# Patient Record
Sex: MALE | Race: OTHER
[De-identification: names, ages, dates, MRNs, and addresses within clinical notes are randomized per-mention and may not be internally consistent; named-entity substitution may affect disease eponyms.]

---

## 2022-06-21 ENCOUNTER — HOSPITAL ENCOUNTER (INPATIENT)
Dept: HOSPITAL 15 - ER | Age: 52
LOS: 3 days | Discharge: HOME | DRG: 201 | End: 2022-06-24
Attending: FAMILY MEDICINE | Admitting: NURSE PRACTITIONER
Payer: MEDICAID

## 2022-06-21 VITALS — BODY MASS INDEX: 28.32 KG/M2 | HEIGHT: 63 IN | WEIGHT: 159.84 LBS

## 2022-06-21 DIAGNOSIS — J18.9: ICD-10-CM

## 2022-06-21 DIAGNOSIS — E43: ICD-10-CM

## 2022-06-21 DIAGNOSIS — Z20.822: ICD-10-CM

## 2022-06-21 DIAGNOSIS — Z83.3: ICD-10-CM

## 2022-06-21 DIAGNOSIS — F32.A: ICD-10-CM

## 2022-06-21 DIAGNOSIS — G89.29: ICD-10-CM

## 2022-06-21 DIAGNOSIS — D64.9: ICD-10-CM

## 2022-06-21 DIAGNOSIS — K29.80: ICD-10-CM

## 2022-06-21 DIAGNOSIS — E86.0: ICD-10-CM

## 2022-06-21 DIAGNOSIS — D50.9: ICD-10-CM

## 2022-06-21 DIAGNOSIS — K26.9: ICD-10-CM

## 2022-06-21 DIAGNOSIS — M50.30: ICD-10-CM

## 2022-06-21 DIAGNOSIS — G93.41: ICD-10-CM

## 2022-06-21 DIAGNOSIS — M51.36: ICD-10-CM

## 2022-06-21 DIAGNOSIS — Z87.442: ICD-10-CM

## 2022-06-21 DIAGNOSIS — E87.6: ICD-10-CM

## 2022-06-21 DIAGNOSIS — F17.200: ICD-10-CM

## 2022-06-21 DIAGNOSIS — R00.1: Primary | ICD-10-CM

## 2022-06-21 DIAGNOSIS — N40.0: ICD-10-CM

## 2022-06-21 DIAGNOSIS — I95.9: ICD-10-CM

## 2022-06-21 DIAGNOSIS — F41.9: ICD-10-CM

## 2022-06-21 DIAGNOSIS — M19.90: ICD-10-CM

## 2022-06-21 DIAGNOSIS — K29.70: ICD-10-CM

## 2022-06-21 LAB
ALBUMIN SERPL-MCNC: 2.9 G/DL (ref 3.4–5)
ALP SERPL-CCNC: 79 U/L (ref 45–117)
ALT SERPL-CCNC: 13 U/L (ref 16–61)
ANION GAP SERPL CALCULATED.3IONS-SCNC: 8 MMOL/L (ref 5–15)
APTT PPP: 24.7 SEC (ref 23.6–33)
BILIRUB SERPL-MCNC: 0.3 MG/DL (ref 0.2–1)
BUN SERPL-MCNC: 28 MG/DL (ref 7–18)
BUN/CREAT SERPL: 22.4
CALCIUM SERPL-MCNC: 7.6 MG/DL (ref 8.5–10.1)
CHLORIDE SERPL-SCNC: 110 MMOL/L (ref 98–107)
CO2 SERPL-SCNC: 21 MMOL/L (ref 21–32)
GLUCOSE SERPL-MCNC: 170 MG/DL (ref 74–106)
HCT VFR BLD AUTO: 29.8 % (ref 41–53)
HCT VFR BLD AUTO: 30.5 % (ref 41–53)
HGB BLD-MCNC: 9.3 G/DL (ref 13.5–17.5)
HGB BLD-MCNC: 9.7 G/DL (ref 13.5–17.5)
INR PPP: 1.13 (ref 0.9–1.15)
MCH RBC QN AUTO: 24.6 PG (ref 28–32)
MCV RBC AUTO: 77.6 FL (ref 80–100)
NRBC BLD QL AUTO: 0.1 %
POTASSIUM SERPL-SCNC: 3.2 MMOL/L (ref 3.5–5.1)
PROT SERPL-MCNC: 5.7 G/DL (ref 6.4–8.2)
SODIUM SERPL-SCNC: 139 MMOL/L (ref 136–145)

## 2022-06-21 PROCEDURE — 70551 MRI BRAIN STEM W/O DYE: CPT

## 2022-06-21 PROCEDURE — 70450 CT HEAD/BRAIN W/O DYE: CPT

## 2022-06-21 PROCEDURE — 96375 TX/PRO/DX INJ NEW DRUG ADDON: CPT

## 2022-06-21 PROCEDURE — 36415 COLL VENOUS BLD VENIPUNCTURE: CPT

## 2022-06-21 PROCEDURE — 96367 TX/PROPH/DG ADDL SEQ IV INF: CPT

## 2022-06-21 PROCEDURE — 95819 EEG AWAKE AND ASLEEP: CPT

## 2022-06-21 PROCEDURE — 85379 FIBRIN DEGRADATION QUANT: CPT

## 2022-06-21 PROCEDURE — 71045 X-RAY EXAM CHEST 1 VIEW: CPT

## 2022-06-21 PROCEDURE — 81001 URINALYSIS AUTO W/SCOPE: CPT

## 2022-06-21 PROCEDURE — 80307 DRUG TEST PRSMV CHEM ANLYZR: CPT

## 2022-06-21 PROCEDURE — 74177 CT ABD & PELVIS W/CONTRAST: CPT

## 2022-06-21 PROCEDURE — 93005 ELECTROCARDIOGRAM TRACING: CPT

## 2022-06-21 PROCEDURE — 71275 CT ANGIOGRAPHY CHEST: CPT

## 2022-06-21 PROCEDURE — 83690 ASSAY OF LIPASE: CPT

## 2022-06-21 PROCEDURE — 96361 HYDRATE IV INFUSION ADD-ON: CPT

## 2022-06-21 PROCEDURE — 85730 THROMBOPLASTIN TIME PARTIAL: CPT

## 2022-06-21 PROCEDURE — 83605 ASSAY OF LACTIC ACID: CPT

## 2022-06-21 PROCEDURE — 86901 BLOOD TYPING SEROLOGIC RH(D): CPT

## 2022-06-21 PROCEDURE — 86900 BLOOD TYPING SEROLOGIC ABO: CPT

## 2022-06-21 PROCEDURE — 80053 COMPREHEN METABOLIC PANEL: CPT

## 2022-06-21 PROCEDURE — 86850 RBC ANTIBODY SCREEN: CPT

## 2022-06-21 PROCEDURE — 85018 HEMOGLOBIN: CPT

## 2022-06-21 PROCEDURE — 84443 ASSAY THYROID STIM HORMONE: CPT

## 2022-06-21 PROCEDURE — 93970 EXTREMITY STUDY: CPT

## 2022-06-21 PROCEDURE — 72148 MRI LUMBAR SPINE W/O DYE: CPT

## 2022-06-21 PROCEDURE — 96365 THER/PROPH/DIAG IV INF INIT: CPT

## 2022-06-21 PROCEDURE — 83880 ASSAY OF NATRIURETIC PEPTIDE: CPT

## 2022-06-21 PROCEDURE — 83735 ASSAY OF MAGNESIUM: CPT

## 2022-06-21 PROCEDURE — 85610 PROTHROMBIN TIME: CPT

## 2022-06-21 PROCEDURE — 96368 THER/DIAG CONCURRENT INF: CPT

## 2022-06-21 PROCEDURE — 71260 CT THORAX DX C+: CPT

## 2022-06-21 PROCEDURE — 85025 COMPLETE CBC W/AUTO DIFF WBC: CPT

## 2022-06-21 PROCEDURE — 43239 EGD BIOPSY SINGLE/MULTIPLE: CPT

## 2022-06-21 PROCEDURE — 85014 HEMATOCRIT: CPT

## 2022-06-21 PROCEDURE — 93306 TTE W/DOPPLER COMPLETE: CPT

## 2022-06-21 PROCEDURE — 99291 CRITICAL CARE FIRST HOUR: CPT

## 2022-06-21 PROCEDURE — 84484 ASSAY OF TROPONIN QUANT: CPT

## 2022-06-22 VITALS — DIASTOLIC BLOOD PRESSURE: 85 MMHG | SYSTOLIC BLOOD PRESSURE: 132 MMHG

## 2022-06-22 VITALS — SYSTOLIC BLOOD PRESSURE: 118 MMHG | DIASTOLIC BLOOD PRESSURE: 71 MMHG

## 2022-06-22 LAB
ALCOHOL, URINE: < 3 MG/DL (ref 0–10)
AMPHETAMINES UR QL SCN: NEGATIVE
BARBITURATES UR QL SCN: NEGATIVE
BENZODIAZ UR QL SCN: NEGATIVE
BZE UR QL SCN: NEGATIVE
CANNABINOIDS UR QL SCN: POSITIVE
OPIATES UR QL SCN: POSITIVE
PCP UR QL SCN: NEGATIVE

## 2022-06-22 RX ADMIN — AZITHROMYCIN DIHYDRATE SCH MLS/HR: 500 INJECTION, POWDER, LYOPHILIZED, FOR SOLUTION INTRAVENOUS at 09:37

## 2022-06-22 RX ADMIN — PANTOPRAZOLE SODIUM SCH MG: 40 TABLET, DELAYED RELEASE ORAL at 09:26

## 2022-06-22 RX ADMIN — OXYCODONE HYDROCHLORIDE AND ACETAMINOPHEN PRN TAB: 5; 325 TABLET ORAL at 18:06

## 2022-06-22 RX ADMIN — OXYCODONE HYDROCHLORIDE AND ACETAMINOPHEN PRN TAB: 5; 325 TABLET ORAL at 13:53

## 2022-06-23 VITALS — DIASTOLIC BLOOD PRESSURE: 78 MMHG | SYSTOLIC BLOOD PRESSURE: 132 MMHG

## 2022-06-23 VITALS — SYSTOLIC BLOOD PRESSURE: 111 MMHG | DIASTOLIC BLOOD PRESSURE: 73 MMHG

## 2022-06-23 VITALS — SYSTOLIC BLOOD PRESSURE: 135 MMHG | DIASTOLIC BLOOD PRESSURE: 88 MMHG

## 2022-06-23 VITALS — DIASTOLIC BLOOD PRESSURE: 88 MMHG | SYSTOLIC BLOOD PRESSURE: 134 MMHG

## 2022-06-23 VITALS — SYSTOLIC BLOOD PRESSURE: 132 MMHG | DIASTOLIC BLOOD PRESSURE: 87 MMHG

## 2022-06-23 LAB
ALBUMIN SERPL-MCNC: 3 G/DL (ref 3.4–5)
ALP SERPL-CCNC: 80 U/L (ref 45–117)
ALT SERPL-CCNC: 12 U/L (ref 16–61)
ANION GAP SERPL CALCULATED.3IONS-SCNC: 7 MMOL/L (ref 5–15)
BILIRUB SERPL-MCNC: 0.3 MG/DL (ref 0.2–1)
BUN SERPL-MCNC: 14 MG/DL (ref 7–18)
BUN/CREAT SERPL: 15.1
CALCIUM SERPL-MCNC: 8.3 MG/DL (ref 8.5–10.1)
CHLORIDE SERPL-SCNC: 112 MMOL/L (ref 98–107)
CO2 SERPL-SCNC: 23 MMOL/L (ref 21–32)
GLUCOSE SERPL-MCNC: 96 MG/DL (ref 74–106)
HCT VFR BLD AUTO: 30.4 % (ref 41–53)
HGB BLD-MCNC: 9.8 G/DL (ref 13.5–17.5)
MCH RBC QN AUTO: 24.6 PG (ref 28–32)
MCV RBC AUTO: 76.5 FL (ref 80–100)
NRBC BLD QL AUTO: 0.2 %
POTASSIUM SERPL-SCNC: 3.3 MMOL/L (ref 3.5–5.1)
PROT SERPL-MCNC: 6.1 G/DL (ref 6.4–8.2)
SODIUM SERPL-SCNC: 142 MMOL/L (ref 136–145)

## 2022-06-23 RX ADMIN — OXYCODONE HYDROCHLORIDE AND ACETAMINOPHEN PRN TAB: 5; 325 TABLET ORAL at 16:19

## 2022-06-23 RX ADMIN — OXYCODONE HYDROCHLORIDE AND ACETAMINOPHEN PRN TAB: 5; 325 TABLET ORAL at 10:45

## 2022-06-23 RX ADMIN — PANTOPRAZOLE SODIUM SCH MG: 40 TABLET, DELAYED RELEASE ORAL at 10:44

## 2022-06-23 RX ADMIN — AZITHROMYCIN DIHYDRATE SCH MLS/HR: 500 INJECTION, POWDER, LYOPHILIZED, FOR SOLUTION INTRAVENOUS at 10:50

## 2022-06-23 RX ADMIN — OXYCODONE HYDROCHLORIDE AND ACETAMINOPHEN PRN TAB: 5; 325 TABLET ORAL at 05:10

## 2022-06-24 VITALS — DIASTOLIC BLOOD PRESSURE: 92 MMHG | SYSTOLIC BLOOD PRESSURE: 134 MMHG

## 2022-06-24 VITALS — DIASTOLIC BLOOD PRESSURE: 86 MMHG | SYSTOLIC BLOOD PRESSURE: 135 MMHG

## 2022-06-24 VITALS — DIASTOLIC BLOOD PRESSURE: 87 MMHG | SYSTOLIC BLOOD PRESSURE: 124 MMHG

## 2022-06-24 PROCEDURE — 0DB78ZX EXCISION OF STOMACH, PYLORUS, VIA NATURAL OR ARTIFICIAL OPENING ENDOSCOPIC, DIAGNOSTIC: ICD-10-PCS | Performed by: INTERNAL MEDICINE

## 2022-06-24 RX ADMIN — OXYCODONE HYDROCHLORIDE AND ACETAMINOPHEN PRN TAB: 5; 325 TABLET ORAL at 03:19

## 2022-06-24 RX ADMIN — SUCRALFATE SCH GM: 1 SUSPENSION ORAL at 16:13

## 2022-06-24 RX ADMIN — SUCRALFATE SCH GM: 1 SUSPENSION ORAL at 10:28

## 2022-06-24 RX ADMIN — OXYCODONE HYDROCHLORIDE AND ACETAMINOPHEN PRN TAB: 5; 325 TABLET ORAL at 13:52

## 2022-06-24 RX ADMIN — AZITHROMYCIN DIHYDRATE SCH MLS/HR: 500 INJECTION, POWDER, LYOPHILIZED, FOR SOLUTION INTRAVENOUS at 10:29

## 2022-06-26 ENCOUNTER — HOSPITAL ENCOUNTER (EMERGENCY)
Dept: HOSPITAL 15 - ER | Age: 52
LOS: 1 days | Discharge: LEFT BEFORE DISCHARGE | End: 2022-06-27
Payer: MEDICAID

## 2022-06-26 VITALS — HEIGHT: 67 IN | BODY MASS INDEX: 25.11 KG/M2 | WEIGHT: 160 LBS

## 2022-06-26 DIAGNOSIS — R55: Primary | ICD-10-CM

## 2022-06-26 DIAGNOSIS — F17.210: ICD-10-CM

## 2022-06-26 DIAGNOSIS — F32.9: ICD-10-CM

## 2022-06-26 DIAGNOSIS — Z53.21: ICD-10-CM

## 2022-06-26 DIAGNOSIS — F41.9: ICD-10-CM

## 2022-06-26 DIAGNOSIS — Z87.442: ICD-10-CM

## 2022-06-26 LAB
ALBUMIN SERPL-MCNC: 3.2 G/DL (ref 3.4–5)
ALP SERPL-CCNC: 71 U/L (ref 45–117)
ALT SERPL-CCNC: 13 U/L (ref 16–61)
ANION GAP SERPL CALCULATED.3IONS-SCNC: 10 MMOL/L (ref 5–15)
APTT PPP: 21 SEC (ref 23.6–33)
BILIRUB SERPL-MCNC: 0.2 MG/DL (ref 0.2–1)
BUN SERPL-MCNC: 35 MG/DL (ref 7–18)
BUN/CREAT SERPL: 36.1
CALCIUM SERPL-MCNC: 8.6 MG/DL (ref 8.5–10.1)
CHLORIDE SERPL-SCNC: 112 MMOL/L (ref 98–107)
CO2 SERPL-SCNC: 22 MMOL/L (ref 21–32)
GLUCOSE SERPL-MCNC: 130 MG/DL (ref 74–106)
HCT VFR BLD AUTO: 25.4 % (ref 41–53)
HGB BLD-MCNC: 7.9 G/DL (ref 13.5–17.5)
INR PPP: 1.06 (ref 0.9–1.15)
MAGNESIUM SERPL-MCNC: 1.8 MG/DL (ref 1.6–2.6)
MCH RBC QN AUTO: 24.1 PG (ref 28–32)
MCV RBC AUTO: 77.6 FL (ref 80–100)
NRBC BLD QL AUTO: 0.2 %
POTASSIUM SERPL-SCNC: 3.6 MMOL/L (ref 3.5–5.1)
PROT SERPL-MCNC: 6.2 G/DL (ref 6.4–8.2)
SODIUM SERPL-SCNC: 144 MMOL/L (ref 136–145)

## 2022-06-26 PROCEDURE — 85730 THROMBOPLASTIN TIME PARTIAL: CPT

## 2022-06-26 PROCEDURE — 85025 COMPLETE CBC W/AUTO DIFF WBC: CPT

## 2022-06-26 PROCEDURE — 85610 PROTHROMBIN TIME: CPT

## 2022-06-26 PROCEDURE — 84484 ASSAY OF TROPONIN QUANT: CPT

## 2022-06-26 PROCEDURE — 80053 COMPREHEN METABOLIC PANEL: CPT

## 2022-06-26 PROCEDURE — 72125 CT NECK SPINE W/O DYE: CPT

## 2022-06-26 PROCEDURE — 71045 X-RAY EXAM CHEST 1 VIEW: CPT

## 2022-06-26 PROCEDURE — 70450 CT HEAD/BRAIN W/O DYE: CPT

## 2022-06-26 PROCEDURE — 36415 COLL VENOUS BLD VENIPUNCTURE: CPT

## 2022-06-26 PROCEDURE — 83735 ASSAY OF MAGNESIUM: CPT

## 2022-06-27 VITALS — SYSTOLIC BLOOD PRESSURE: 133 MMHG | DIASTOLIC BLOOD PRESSURE: 86 MMHG

## 2025-01-19 ENCOUNTER — HOSPITAL ENCOUNTER (INPATIENT)
Dept: HOSPITAL 15 - ER | Age: 55
LOS: 1 days | Discharge: LEFT BEFORE DISCHARGE | DRG: 52 | End: 2025-01-20
Attending: NURSE PRACTITIONER
Payer: MEDICAID

## 2025-01-19 VITALS — HEIGHT: 68 IN | WEIGHT: 160.28 LBS | BODY MASS INDEX: 24.29 KG/M2

## 2025-01-19 VITALS — HEART RATE: 65 BPM | OXYGEN SATURATION: 97 % | RESPIRATION RATE: 16 BRPM

## 2025-01-19 VITALS — RESPIRATION RATE: 18 BRPM | OXYGEN SATURATION: 97 % | HEART RATE: 83 BPM

## 2025-01-19 DIAGNOSIS — I10: ICD-10-CM
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
## 2025-01-19 DIAGNOSIS — F32.A: ICD-10-CM

## 2025-01-19 DIAGNOSIS — Z53.29: ICD-10-CM

## 2025-01-19 DIAGNOSIS — Z86.73: ICD-10-CM

## 2025-01-19 DIAGNOSIS — F41.9: ICD-10-CM

## 2025-01-19 DIAGNOSIS — T40.605A: ICD-10-CM

## 2025-01-19 DIAGNOSIS — F17.210: ICD-10-CM

## 2025-01-19 DIAGNOSIS — G92.8: Primary | ICD-10-CM

## 2025-01-19 DIAGNOSIS — Z79.899: ICD-10-CM

## 2025-01-19 DIAGNOSIS — T43.595A: ICD-10-CM

## 2025-01-19 DIAGNOSIS — Z87.442: ICD-10-CM

## 2025-01-19 LAB
ALBUMIN SERPL-MCNC: 4.5 G/DL (ref 3.2–4.8)
ALP SERPL-CCNC: 111 U/L (ref 46–116)
ALT SERPL-CCNC: 10 U/L (ref 7–40)
ANION GAP SERPL CALCULATED.3IONS-SCNC: 7 MMOL/L (ref 5–15)
APTT PPP: 32.1 SEC (ref 24.5–34.5)
BILIRUB SERPL-MCNC: 0.6 MG/DL (ref 0.2–1)
BUN SERPL-MCNC: 6 MG/DL (ref 9–23)
BUN/CREAT SERPL: 6.5 (ref 10–20)
CALCIUM SERPL-MCNC: 10.1 MG/DL (ref 8.7–10.4)
CHLORIDE SERPL-SCNC: 111 MMOL/L (ref 98–107)
CO2 SERPL-SCNC: 24 MMOL/L (ref 20–31)
GLUCOSE SERPL-MCNC: 111 MG/DL (ref 74–106)
HCT VFR BLD AUTO: 39 % (ref 41–53)
HGB BLD-MCNC: 12.3 G/DL (ref 13.5–17.5)
INR PPP: 1.03 (ref 0.9–1.15)
MAGNESIUM SERPL-MCNC: 2 MG/DL (ref 1.6–2.6)
MCH RBC QN AUTO: 24.4 PG (ref 28–32)
MCV RBC AUTO: 77.6 FL (ref 80–100)
NRBC BLD QL AUTO: 0.3 %
POTASSIUM SERPL-SCNC: 3.6 MMOL/L (ref 3.5–5.1)
PROT SERPL-MCNC: 6.9 G/DL (ref 5.7–8.2)
PROTHROMBIN TIME: 10.9 SEC (ref 9.3–11.8)
SODIUM SERPL-SCNC: 142 MMOL/L (ref 136–145)

## 2025-01-19 PROCEDURE — 82140 ASSAY OF AMMONIA: CPT

## 2025-01-19 PROCEDURE — 70450 CT HEAD/BRAIN W/O DYE: CPT

## 2025-01-19 PROCEDURE — 83880 ASSAY OF NATRIURETIC PEPTIDE: CPT

## 2025-01-19 PROCEDURE — 82962 GLUCOSE BLOOD TEST: CPT

## 2025-01-19 PROCEDURE — 85025 COMPLETE CBC W/AUTO DIFF WBC: CPT

## 2025-01-19 PROCEDURE — 81001 URINALYSIS AUTO W/SCOPE: CPT

## 2025-01-19 PROCEDURE — 85610 PROTHROMBIN TIME: CPT

## 2025-01-19 PROCEDURE — 80307 DRUG TEST PRSMV CHEM ANLYZR: CPT

## 2025-01-19 PROCEDURE — 80053 COMPREHEN METABOLIC PANEL: CPT

## 2025-01-19 PROCEDURE — 71045 X-RAY EXAM CHEST 1 VIEW: CPT

## 2025-01-19 PROCEDURE — 99291 CRITICAL CARE FIRST HOUR: CPT

## 2025-01-19 PROCEDURE — 83735 ASSAY OF MAGNESIUM: CPT

## 2025-01-19 PROCEDURE — 85730 THROMBOPLASTIN TIME PARTIAL: CPT

## 2025-01-19 PROCEDURE — 84484 ASSAY OF TROPONIN QUANT: CPT

## 2025-01-19 PROCEDURE — 36415 COLL VENOUS BLD VENIPUNCTURE: CPT

## 2025-01-19 PROCEDURE — 93005 ELECTROCARDIOGRAM TRACING: CPT

## 2025-01-19 RX ADMIN — HYDROCODONE BITARTRATE AND ACETAMINOPHEN PRN TAB: 5; 325 TABLET ORAL at 21:48

## 2025-01-19 RX ADMIN — SODIUM CHLORIDE SCH MLS/HR: 0.9 INJECTION, SOLUTION INTRAVENOUS at 15:45

## 2025-01-19 NOTE — BSKYNEURO
[facility]





# Demographics


Consult Type: Acute Stroke Level 2 (4.5-24 hrs)





Patient Location: Emergency Room





First Name: DUANE





Last Name: AMANDEEP





YOB: 1970





Age: 54





Gender: Male





Facility: [facility]





Time of Initial Page ([place] Time): 01/19/2025, 08:53





Time of Return Call ([place] Time): 01/19/2025, 08:53





# HPI


History:


[id number]


Patient is coming to ER for speech changes; he was not able to communicate with 

family and was brought for difficulty with word finding.





# Scores


Time of exam and NIHSS ([place] Time): 01/19/2025, 12:07





Level of Consciousness 1a: [1] = Not alert; but arousable by minor stim





LOC Questions 1b: [2] = Answers neither correctly





LOC Commands 1c: [0] = Performs both tasks correctly





Best Gaze 2: [0] = Normal





Visual 3: [0] = No visual loss





Facial Palsy 4: [0] = Normal symmetrical movements





Motor Arm Left 5a: [0] = No drift





Motor Arm Right 5b: [0] = No drift





Motor Leg Left 6a: [0] = No drift





Motor Leg Right 6b: [0] = No drift





Limb Ataxia 7: [0] = Absent





Sensory 8: [0] = Normal





Best Language 9: [2] = Severe aphasia





Dysarthria 10: [2] = Severe dysarthria





Extinction and Inattention 11: [0] = No abnormality





NIHSS Total: 7





# Assessment


Impression:


- Altered Mental Status


- Stroke Mimic


Patient looks encephalopathic; likely metabolic/infectious. If no other obvious 

cause of encephalopathy and patient is not improving, will need MRI brain w/o 

contrast and EEG





# Plan


Thrombolytic/Intervention: NOT IV Thrombolysis or IA Intervention candidate





Intraarterial Exclusion:


- no large vessel occlusion (LVO)





Thrombolytic/Intraarterial Exclusion:


- IV thrombolytic and IA intervention considered but not recommended as this 

patient's symptoms are not clinically consistent with an assumed diagnosis of 

stroke





Labs:


- Ammonia


- urine drug screen


- ua


- CBC


- comprehensive metabolic panel


- ESR


- ABG





Imaging: (urgency: routine):


- MRI Brain without contrast





Diagnostic Test:


- EEG





Other:


- If patient has any neurological deterioration please call me back immediately


- I have discussed my recommendations with the referring provider


- would not pursue stroke work-up if MRI is negative


- telemetry monitoring





# Logistics


Attestation of consult completion: The patient is located at: [facility]. Facility staff participated in the visit. I performed this 

telemedicine visit from my offsite office utilizing interactive 2 way audio and 

visual telecommunication technology.





Total time spent in telemedicine encounter: I spent 10 minutes reviewing 

clinical data and/or imaging, obtaining history, examining the patient, 

communicating with the onsite care team, and in preparation of this report.





# Demographics


First Name: DUANE





Last Name: AMANDEEP





Facility: [facility]


Yes











ISIS SHAHID MD               Jan 19, 2025 13:48

## 2025-01-19 NOTE — DVHHP2
History of Present Illness


Reason for Visit:  altered


History of Present Illness


This 54 year old male presents in the ED with a chief complaint of ALOC.  The 

patient is alert and oriented to name and date of birth but somehow confused, 

very sleepy, and unable to keep eyes open.  According to nursing staff, the 

patient was last seen alert and oriented this morning around 1:00 a.m. the 

patient change of mentation started early this morning, sister states that the 

patient started taking some type of medications.


Past Medical History


Anxiety, arthritis, CVA, depression, kidney stones


Past Surgical History


Denies


Past Social History


Unknown





Review of Systems


Constitutional:  Yes: Malaise; No: Fever, Chills, Sweats, Weakness, Other


Eyes:  No: Pain, Vision change, Conjunctivae inflammation, Eyelid inflammation, 

Other, Redness


ENT:  No: Ear pain, Ear discharge, Nose pain, Nose discharge, Nose congestion, 

Mouth pain, Mouth swelling, Throat pain, Throat swelling, Other


Respiratory:  No: Cough, Dry, Shortness of breath, SOB with excertion, Wheezing,

Hemoptysis, Pleuritic Pain, Sputum, Wheezing, Other


Cardiovascular:  No: Chest Pain, Palpitations, Orthopnea, Paroxysmal Noc. 

Dyspnea, Edema, Lt Headedness, Other


Gastrointestinal:  No: Nausea, Vomiting, Abdominal Pain, Diarrhea, Constipation,

Melena, Hematochezia, Other


Genitourinary:  No Dysuria, No Frequency, No Incontinence, No Hematuria, No 

Retention, No Other


Musculoskeletal:  No: other, neck pain, shoulder pain, arm pain, back pain, hand

pain, leg pain, foot pain


Skin:  No: Rash, Lesions, Jaundice, Bruising, Other


Neurological:  Confusion; No: Weakness, Numbness, Incoordination, Change in 

speech, Seizures, Other


Allergies:  


Coded Allergies:  


     NO KNOWN ALLERGIES (Unverified , 8/13/11)





Exam


Vital Signs





Vital Signs








  Date Time  Temp Pulse Resp B/P (MAP) Pulse Ox O2 Delivery O2 Flow Rate FiO2


 


1/19/25 14:00  45 19 179/91 (120) 99   


 


1/19/25 09:39      Room Air* 0 21


 


1/19/25 08:40 98.7       








General Appearance:  mild distress


HEENT:  Atraumatic, PERRLA, Mucous membr. moist/pink


Respiratory:  Clear to auscultation, Normal air movement


Cardiovascular:  Regular rate, Normal S1, Normal S2


Abdominal:  Normal bowel sounds, Soft, No tenderness


Extremities:  No clubbing, No cyanosis, No edema, Normal pulses


Skin:  No rashes, No breakdown, No significant lesion


Neuro:  Other (Confused)





Labs/Xrays





                                      Labs








Test


 1/19/25


12:57 1/19/25


10:05 1/19/25


09:20 1/19/25


09:05 Range/Units


 


 


Ammonia 15     11-32  umol/L


 


Troponin I High Sensitivity  < 3 L   </=54  ng/L


 


POC Glucose   102     mg/dl


 


White Blood Count


 


 


 


 7.2 


 4.4-10.8


10^3/uL


 


Red Blood Count


 


 


 


 5.02 


 4.5-5.90


10^6/uL


 


Hemoglobin    12.3 L 13.5-17.5  g/dL


 


Hematocrit    39.0 L 41.0-53.0  %


 


Mean Corpuscular Volume    77.6 L 80.0-100.0  fL


 


Mean Corpuscular Hemoglobin    24.4 L 28.0-32.0  pg


 


Mean Corpuscular Hemoglobin


Concent 


 


 


 31.4 L


 32.0-36.0  g/dL





 


Red Cell Distribution Width    17.9 H 11.8-14.3  %


 


Platelet Count


 


 


 


 396 


 140-450


10^3/uL


 


Mean Platelet Volume    6.6 L 6.9-10.8  fL


 


Neutrophils (%) (Auto)    58.7  37.0-80.0  %


 


Lymphocytes (%) (Auto)    30.0  10.0-50.0  %


 


Monocytes (%) (Auto)    8.5  0.0-12.0  %


 


Eosinophils (%) (Auto)    1.5  0.0-7.0  %


 


Basophils (%) (Auto)    1.3  0.0-2.0  %


 


Neutrophils # (Auto)


 


 


 


 4.2 


 1.6-8.6  10


^3/uL


 


Lymphocytes # (Auto)


 


 


 


 2.2 


 0.4-5.4  10


^3/uL


 


Monocytes # (Auto)    0.6  0-1.3  10 ^3/uL


 


Eosinophils # (Auto)    0.1  0-0.8  10 ^3/uL


 


Basophils # (Auto)    0.1  0-0.2  10 ^3/uL


 


Nucleated Red Blood Cells    0.3   %


 


Prothrombin Time    10.9  9.3-11.8  sec


 


Prothrombin Time INR    1.03  0.9-1.15  


 


Activated Partial


Thromboplast Time 


 


 


 32.1 


 24.5-34.5  SEC





 


Sodium Level    142  136-145  mmol/L


 


Potassium Level    3.6  3.5-5.1  mmol/L


 


Chloride Level    111 H   mmol/L


 


Carbon Dioxide Level    24  20-31  mmol/L


 


Anion Gap    7  5-15  


 


Blood Urea Nitrogen    6 L 9-23  mg/dL


 


Creatinine


 


 


 


 0.92 


 0.700-1.30


mg/dL


 


Glomerular Filtration Rate


Calc 


 


 


 99 


 >90  mL/min





 


BUN/Creatinine Ratio    6.5 L 10.0-20.0  


 


Serum Glucose    111 H   mg/dL


 


Calcium Level    10.1  8.7-10.4  mg/dL


 


Magnesium Level    2.0  1.6-2.6  mg/dL


 


Total Bilirubin    0.6  0.2-1.0  mg/dL


 


Aspartate Amino Transferase


(AST) 


 


 


 17 


 13-40  U/L





 


Alanine Aminotransferase (ALT)    10  7-40  U/L


 


Alkaline Phosphatase    111    U/L


 


B-Type Natriuretic Peptide    21.45  0-100  pg/mL


 


Total Protein    6.9  5.7-8.2  g/dL


 


Albumin    4.5  3.2-4.8  g/dL





PROCEDURE(s): CTH - STROKE CTH


REASON: cva expressive aphasia


ORDER NUMBER(s): [id number], ACCESSION NUMBER(s): [id number]








CT STROKE CTH





INDICATION: cva expressive aphasia





EXAM DATE: 01/19/2025 09:04 AM





COMPARISON: HEAD WITHOUT CONTRAST on DOS: 6/26/22





RADIATION DOSE:  CTDIvol: 59 mGy, DLP: 1194 mGy*cm





PROCEDURE: CT scans of the head were obtained from the vertex to the skull base.

Sagittal and coronal reconstructions were provided.





All CT scans at this medical facility are performed using dose modulation 

techniques as appropriate to a performed exam including the following: Automated

exposure control was utilized; adjustment of the MA and/or KV according to 

patient size; and use of iterative reconstruction technique.





FINDINGS:   There is sulcal and ventricular prominence. The brain otherwise 

shows normal morphology and gray-white matter differentiation, without 

intracranial hemorrhage, extra-axial fluid collection, mass effect or acute 

large vessel infarct.The basal cisterns are patent. The skull and visible facial

bones are intact. The paranasal sinuses, mastoid air cells and middle ear 

cavities are well-aerated. The soft tissues of the scalp are unremarkable.





IMPRESSION: 





No acute intracranial abnormality.





Assessment/Plan


Assessment/Plan


#Altered level of consciousness


# possible acute toxic encephalopathy


Admit to telemetry unit


CT of head reviewed


Neurology consult completed


Check for UDS


Scherer catheter





# depression, anxiety


Hold medications





# hx CVA





Medical plan discussed with patient and RN


Plan discussed with:  Patient


My Orders





                          Orders - NISA BARKER








Procedure Category Date Status





  Time 


 


Urinalysis LAB 1/19/25 Logged





  15:18 


 


Drug Screen LAB 1/19/25 Logged





  15:18 


 


Insert Scherer Catheter CARLY 1/19/25 Verified





  15:33 


 


Admit ADMIT 1/19/25 Verified





  15:33 


 


Code Status CODE 1/19/25 Verified





  15:33 


 


0.9% Ns 1000 Ml PHA 1/19/25 Verified





  15:45 


 


Ondansetron Hcl PHA 1/19/25 Verified





 (Zofran)  15:45 


 


Enoxaparin Sodium PHA 1/20/25 Verified





 (Lovenox)  10:00 


 


Fall Risk Precautions CARLY 1/19/25 Verified





In Place  15:33 


 


Complete Blood Count LAB 1/20/25 Verified





  04:00 


 


Comprehensive LAB 1/20/25 Verified





Metabolic Panel  04:00 


 


Cardiac DIET 1/19/25 Verified





Diet-2gna,Lofat,Lochol  Dinner 


 


Condition: Fair CARLY 1/19/25 Verified





  15:33 











Date of Service:  Jan 19, 2025


Billing Provider:  NISA BARKER


Common Visit Codes:  99223-INITIAL  INP/OBS CARE (HIGH)











NISA BARKERP            Jan 19, 2025 15:52

## 2025-01-19 NOTE — DVH
CT STROKE CTH



INDICATION: cva expressive aphasia



EXAM DATE: 01/19/2025 09:04 AM



COMPARISON: HEAD WITHOUT CONTRAST on DOS: 6/26/22



RADIATION DOSE:  CTDIvol: 59 mGy, DLP: 1194 mGy*cm



PROCEDURE: CT scans of the head were obtained from the vertex to the skull base. Sagittal and coronal
 reconstructions were provided.



All CT scans at this medical facility are performed using dose modulation techniques as appropriate t
o a performed exam including the following: Automated exposure control was utilized; adjustment of th
e MA and/or KV according to patient size; and use of iterative reconstruction technique.



FINDINGS:   There is sulcal and ventricular prominence. The brain otherwise shows normal morphology a
nd gray-white matter differentiation, without intracranial hemorrhage, extra-axial fluid collection, 
mass effect or acute large vessel infarct.The basal cisterns are patent. The skull and visible facial
 bones are intact. The paranasal sinuses, mastoid air cells and middle ear cavities are well-aerated.
 The soft tissues of the scalp are unremarkable.



IMPRESSION: 



No acute intracranial abnormality.



Electronically Signed by: Saravanan Pruitt at 01/19/2025 09:22:39 AM

## 2025-01-19 NOTE — ECG
[facility]

                                       

Test Date:    2025               Test Time:    09:43:29

Pat Name:     DUANE BERNSTEIN             Department:   ER

Patient ID:   [id number]           Room:         [address] [place] [address]

Gender:       M                        Technician:   DEEPA

:          1970               Requested By: STEVEN VEGA

Order Number: [id number]        Reading MD:   Moris Coffman

                                 Measurements

Intervals                              Axis          

Rate:         55                       P:            18

MN:           161                      QRS:          -24

QRSD:         109                      T:            47

QT:           466                                    

QTc:          446                                    

                           Interpretive Statements

Sinus rhythm

Borderline left axis deviation

Consider anterior infarct

Electronically Signed On 2025 16:11:40 PST by Moris Coffman



Please click the below link to view image of tracing.

## 2025-01-19 NOTE — DVH
CHEST RADIOGRAPH



Indication: cva expressive aphasia



Technique: Single frontal view of the chest was obtained



COMPARISON: CHEST XRAY 1 VIEW on DOS: 6/26/22, CXR1 on DOS: 6/26/22, CXR1 on DOS: 6/21/22



FINDINGS: 



No pneumothorax, pulmonary edema, or consolidative infiltrates. Head and neck tissues partially obscu
re the lung apices. The heart is upper limits of normal in size.



IMPRESSION: 



No acute intrathoracic process. 



Electronically Signed by: MARIA C OMER at 01/19/2025 09:24:44 AM

## 2025-01-19 NOTE — ED.PDOC
Altered Mental Status


HPI Comments


54-year-old male brought in by EMS presents with a chief complaint of altered 

level of consciousness x onset this morning.  According to EMS family found 

patient altered this morning.  Last known well time was this morning at 1:00 

a.m., patient is now presenting A&O x2.  Is normally GCS of 15 at baseline.  

Presenting with expressive aphasia, confused, restless, is tachycardic, and is 

shaking. Patient unable to provide clear details of his current symptoms or 

timeline of events leading up to his arrival.


Chief Complaint:  ALOC


Time Seen by MD:  08:50


Primary Care Provider:  UNKNOWN


Reviewed Notes:  Medications, Allergies


Allergies:  


Coded Allergies:  


     NO KNOWN ALLERGIES (Unverified , 8/13/11)


Home Meds


Active Scripts


Oxycodone Hcl (OxyCONTIN ER Tablet) 10 Mg Tb, 2 TAB PO BID, #40 TAB


   Prov:DOROTHY COLEMAN MD         6/24/22


Sucralfate (Sucralfate) 1 Gm Tab, 1 GM PO Q6HR, #60 TAB


   Prov:DOROTHY COLEMAN MD         6/24/22


Pantoprazole Sodium Sesquihydr (Pantoprazole Sodium) 40 Mg Tab, 40 MG PO BID, 

#60 TAB


   Prov:DOROTHY COLEMAN MD         6/24/22


Reported Medications


Oxycodone W/ Acetaminophen (Percocet 5/325MG) 1 Tab Tb, 1 TAB PO BID, #60 TAB


   6/22/22


Oxycodone Hcl (OxyCONTIN ER Tablet) 20 Mg Tb, 30 MG PO BID, TAB


   6/22/22


Loperamide Hcl (Loperamide Hcl) 2 Mg Cap, 2 MG PO PRN


   6/6/12


Multiple Vitamin (Multivitamins)  Cap


   6/6/12


[Formula 303]   No Conflict Check


   6/6/12


[Ageless Male] 800 MG CAP No Conflict Check, 800 MG PO DAILY


   6/6/12


[Paxil] 40 MG TAB No Conflict Check, 40 MG PO DAILY


   6/6/12


Tramadol Hcl (Tramadol Hcl) 50 Mg Tab, 50 MG PO PRN


   6/6/12


Information Source:  Emergency Med Personnel


Mode of Arrival:  EMS


Severity:  Moderate, Unable to Care for Self


Timing:  Hours


Duration:  Since onset


Prehospital treatment:  None


Quality:  Decreased Alertness, Change in Behavior, Confusion


Recent:  None


History of:  CVA


Associated Signs and Symptoms:  None





Past Medical History


PAST MEDICAL HISTORY:  Anxiety, Arthritis, CVA, Depression, Kidney Stones


Surgical History:  Denies all surgeries





Family History


Family History:  Unknown





Social History


Smoker:  Cigarettes, Greater Than 1 Pack/Day


Alcohol:  Occasionally


Drugs:  Denies Drug Use


Lives In:  Home





Constitutional:  denies: chills, diaphoresis, fatigue, fever, malaise, sweats, 

weakness, others


EENTM:  denies: blurred vision, double vision, ear bleeding, ear discharge, ear 

drainage, ear pain, ear ringing, eye pain, eye redness, hearing loss, mouth 

pain, mouth swelling, nasal discharge, nose bleeding, nose congestion, nose 

pain, photophobia, tearing, throat pain, throat swelling, voice changes, others


Respiratory:  denies: cough, hemoptysis, orthopnea, SOB at rest, shortness of 

breath, SOB with excertion, stridor, wheezing, others


Cardiovascular:  denies: chest pain, dizzy spells, diaphoresis, Dyspnea on 

exertion, edema, irregular heart beat, left arm pain, lightheadedness, 

palpitations, PND, syncope, others


Gastrointestinal:  denies: abdomen distended, abdominal pain, blood streaked 

bowels, constipated, diarrhea, dysphagia, difficulty swallowing, hematemesis, 

melena, nausea, poor appetite, poor fluid intake, rectal bleeding, rectal pain, 

vomiting, others


Genitourinary:  denies: burning, dysuria, flank pain, frequency, hematuria, 

incontinence, penile discharge, penile sore, pain, testicle pain, testicle 

swelling, urgency, others


Neurological:  denies: dizziness, fainting, headache, left sided numbness, left 

sided weakness, numbness, paresthesia, pre-existing deficit, right sided 

numbness, right sided weakness, seizure, speech problems, tingling, tremors, 

weakness, others


Musculoskeletal:  denies: back pain, gout, joint pain, joint swelling, muscle p

ain, muscle stiffness, neck pain, others


Integumetry:  denies: bruises, change in color, change in hair/nails, dryness, 

laceration, lesions, lumps, rash, wounds, others


Allergic/Immunocompromised:  denies: Difficulty Healing, Frequent Infections, 

Hives, Itching, others


Hematologic/Lymphatic:  denies: anemia, blood clots, easy bleeding, easy 

bruising, swollen glands, others


Endocrine:  denies: excessive hunger, excessive sweating, excessive thirst, 

excessive urination, flushing, intolerance to cold, intolerance to heat, 

unexplained weight gain, unexplained weight loss, others


Psychiatric:  denies: anxiety, bipolar disorder, depression, hopeless, panic 

disorder, schizophrenia, sleepless, suicidal, others


Unable to Obtain due to:  Altered Mental Status


All Other Systems:  Reviewed and Negative





Physical Exam


General Appearance:  No Apparent Distress, Normal


HEENT:  Normal ENT Inspection, Pharynx Normal, TMs Normal


Neck:  Full Range of Motion, Non-Tender, Normal, Normal Inspection


Respiratory:  Chest Non-Tender, Lungs Clear, No Accessory Muscle Use, No 

Respiratory Distress, Normal Breath Sounds


Cardiovascular:  No Edema, No JVD, No Murmur, No Gallop, Normal Peripheral 

Pulses, Tachycardia


Breast Exam:  Deferred


Gastrointestinal:  No Organomegaly, Non Tender, No Pulsatile Mass, Normal Bowel 

Sounds, Soft


Genitalia:  Deferred


Pelvic:  Deferred


Rectal:  Deferred


Extremities:  No calf tenderness, Normal capillary refill, Normal inspection, 

Normal range of motion, Non-tender, No pedal edema


Musculoskeletal :  


   Apperance:  Normal


Neurologic:  Aphasia (EXPRESSIVE), Motor Weakness


Cerebellar Function:  Normal


Reflexes:  Normal


Skin:  Dry, Normal Color, Warm


Lymphatic:  No Adenopathy





Was a procedure done?


Was a procedure done?:  No





Differential Diagnosis (ALOC)


Differential Diagnosis:  Dehydration, Hypoglycemia, Encephalopathy, Meningitis, 

Sepsis, Hypoxemia, Seizure, Closed Head Injury, CVA, Mass Lesion, SAH, Drug 

Overdose, ETOH Intoxication





X-Ray, Labs, Meds, VS





                                   Vital Signs








  Date Time  Temp Pulse Resp B/P (MAP) Pulse Ox O2 Delivery O2 Flow Rate FiO2


 


1/19/25 09:43  55      


 


1/19/25 09:39  65 16 137/84 (101) 97   


 


1/19/25 09:39  65 16  97 Room Air* 0 21


 


1/19/25 08:40 98.7 71 16 144/73 (96) 98   








                                       Lab








Test


 1/19/25


10:05 1/19/25


09:20 1/19/25


09:05 Range/Units


 


 


Troponin I High Sensitivity < 3 L  < 3 L </=54  ng/L


 


POC Glucose  102     mg/dl


 


White Blood Count


 


 


 7.2 


 4.4-10.8


10^3/uL


 


Red Blood Count


 


 


 5.02 


 4.5-5.90


10^6/uL


 


Hemoglobin   12.3 L 13.5-17.5  g/dL


 


Hematocrit   39.0 L 41.0-53.0  %


 


Mean Corpuscular Volume   77.6 L 80.0-100.0  fL


 


Mean Corpuscular Hemoglobin   24.4 L 28.0-32.0  pg


 


Mean Corpuscular Hemoglobin


Concent 


 


 31.4 L


 32.0-36.0  g/dL





 


Red Cell Distribution Width   17.9 H 11.8-14.3  %


 


Platelet Count


 


 


 396 


 140-450


10^3/uL


 


Mean Platelet Volume   6.6 L 6.9-10.8  fL


 


Neutrophils (%) (Auto)   58.7  37.0-80.0  %


 


Lymphocytes (%) (Auto)   30.0  10.0-50.0  %


 


Monocytes (%) (Auto)   8.5  0.0-12.0  %


 


Eosinophils (%) (Auto)   1.5  0.0-7.0  %


 


Basophils (%) (Auto)   1.3  0.0-2.0  %


 


Neutrophils # (Auto)


 


 


 4.2 


 1.6-8.6  10


^3/uL


 


Lymphocytes # (Auto)


 


 


 2.2 


 0.4-5.4  10


^3/uL


 


Monocytes # (Auto)   0.6  0-1.3  10 ^3/uL


 


Eosinophils # (Auto)   0.1  0-0.8  10 ^3/uL


 


Basophils # (Auto)   0.1  0-0.2  10 ^3/uL


 


Nucleated Red Blood Cells   0.3   %


 


Prothrombin Time   10.9  9.3-11.8  sec


 


Prothrombin Time INR   1.03  0.9-1.15  


 


Activated Partial


Thromboplast Time 


 


 32.1 


 24.5-34.5  SEC





 


Sodium Level   142  136-145  mmol/L


 


Potassium Level   3.6  3.5-5.1  mmol/L


 


Chloride Level   111 H   mmol/L


 


Carbon Dioxide Level   24  20-31  mmol/L


 


Anion Gap   7  5-15  


 


Blood Urea Nitrogen   6 L 9-23  mg/dL


 


Creatinine


 


 


 0.92 


 0.700-1.30


mg/dL


 


Glomerular Filtration Rate


Calc 


 


 99 


 >90  mL/min





 


BUN/Creatinine Ratio   6.5 L 10.0-20.0  


 


Serum Glucose   111 H   mg/dL


 


Calcium Level   10.1  8.7-10.4  mg/dL


 


Magnesium Level   2.0  1.6-2.6  mg/dL


 


Total Bilirubin   0.6  0.2-1.0  mg/dL


 


Aspartate Amino Transferase


(AST) 


 


 17 


 13-40  U/L





 


Alanine Aminotransferase (ALT)   10  7-40  U/L


 


Alkaline Phosphatase   111    U/L


 


B-Type Natriuretic Peptide   21.45  0-100  pg/mL


 


Total Protein   6.9  5.7-8.2  g/dL


 


Albumin   4.5  3.2-4.8  g/dL








Time of 1ST Reevaluation:  09:20


Reevaluation 1ST:  Unchanged


Time of 2ND Reevaluation:  12:18


Reevaluation 2ND:  Unchanged (pt is confused, and restless)


Patient Education/Counseling:  Other (encephalopathic)


Family Education/Counseling:  No Family Present


Comments


I reviewed the following notes from patient's past medical encounters:





The following tests were ordered, and results were reviewed by me: (Labs, XY, 

EKG)





Additional Information was gathered from interviewing the following independent 

historians: (Family, Other Providers, EMT)





I reviewed and agreed with the following test results read by other providers: 

(X-Ray, CT, US)





I discussed treatment and results with medical personnel and: (Consultants, 

Family)


Additional Information


teleneurology assessed the pt and feels pt is encephalopathic, not having a 

stroke. he will be admitted for further evaluation





Departure 1


Departure


Time of Disposition:  12:19


Impression:  


   Primary Impression:  


   Altered mental status


   Qualified Codes:  R41.0 - Disorientation, unspecified


   Additional Impression:  


   Toxic encephalopathy


   Qualified Codes:  G92.9 - Unspecified toxic encephalopathy


Disposition:  09 ADMITTED AS INPATIENT


Admit to:  Tele


Condition:  Serious





Critical Care Note


Critical Care Time?:  Yes (1 hr-critical care time only)


Critical care comment:


Due to concerns for patients condition deteriorating, the care required my 

highest level of attention and readiness to intervene.   I assessed the patient,

 reviewed the medical records, ordered the appropriate tests and treatments, 

then reassessed for results and responsiveness. I communicated with medical 

personnel and consultants and formulated a plan of care. Total critical care 

time excludes any procedures





Stability


Stability form required:  No








I personally scribed for STEVEN VEGA MD ([email or web address]) on 1/19/25 at 09:22.  

Electronically submitted by Alfa Raines ([email or web address]).





STEVEN VEGA MD                Jan 19, 2025 09:22

## 2025-01-20 VITALS — HEART RATE: 89 BPM | OXYGEN SATURATION: 99 % | RESPIRATION RATE: 15 BRPM

## 2025-01-20 VITALS
SYSTOLIC BLOOD PRESSURE: 144 MMHG | RESPIRATION RATE: 17 BRPM | HEART RATE: 86 BPM | DIASTOLIC BLOOD PRESSURE: 87 MMHG | OXYGEN SATURATION: 99 %

## 2025-01-20 VITALS — TEMPERATURE: 98.4 F

## 2025-01-20 LAB
ALBUMIN SERPL-MCNC: 3.9 G/DL (ref 3.2–4.8)
ALP SERPL-CCNC: 95 U/L (ref 46–116)
ALT SERPL-CCNC: < 9 U/L (ref 7–40)
ANION GAP SERPL CALCULATED.3IONS-SCNC: 6 MMOL/L (ref 5–15)
BILIRUB SERPL-MCNC: 0.7 MG/DL (ref 0.2–1)
BUN SERPL-MCNC: < 5 MG/DL (ref 9–23)
BUN/CREAT SERPL: 6 (ref 10–20)
CALCIUM SERPL-MCNC: 9.6 MG/DL (ref 8.7–10.4)
CHLORIDE SERPL-SCNC: 115 MMOL/L (ref 98–107)
CO2 SERPL-SCNC: 23 MMOL/L (ref 20–31)
GLUCOSE SERPL-MCNC: 97 MG/DL (ref 74–106)
HCT VFR BLD AUTO: 34.7 % (ref 41–53)
HGB BLD-MCNC: 11.2 G/DL (ref 13.5–17.5)
MCH RBC QN AUTO: 24.8 PG (ref 28–32)
MCV RBC AUTO: 76.8 FL (ref 80–100)
NRBC BLD QL AUTO: 0 %
POTASSIUM SERPL-SCNC: 3.5 MMOL/L (ref 3.5–5.1)
PROT SERPL-MCNC: 6.4 G/DL (ref 5.7–8.2)
SODIUM SERPL-SCNC: 144 MMOL/L (ref 136–145)

## 2025-01-20 RX ADMIN — ENOXAPARIN SODIUM SCH MG: 40 INJECTION SUBCUTANEOUS at 10:00

## 2025-01-20 NOTE — DVHDS2
Discharge Summary


Date of Admission


Jan 19, 2025 at 15:33





Date of Discharge:


Jan 20, 2025





Admitting Diagnosis


Toxic metabolic encephalopathy





Labs/Diagnostic Data:





                               Laboratory Results








Test


 1/20/25


08:48 1/20/25


05:08 1/20/25


04:04 1/19/25


12:57


 


POC Glucose


 91 mg/dl


() 


 


 





 


Urine Color


 


 Light-yellow


(Yellow) 


 





 


Urine Clarity  Clear (Clear)   


 


Urine pH  7.0 (5.0-9.0)   


 


Urine Specific Gravity


 


 1.010


(1.001-1.035) 


 





 


Urine Protein


 


 Negative


(Negative) 


 





 


Urine Ketones


 


 Trace


(Negative) 


 





 


Urine Blood


 


 Negative /uL


(Negative) 


 





 


Urine Nitrite


 


 Negative


(Negative) 


 





 


Urine Bilirubin


 


 Negative


(Negative) 


 





 


Urine Urobilinogen


 


 Normal mg/dL


(Negative) 


 





 


Urine Leukocyte Esterase


 


 Negative /uL


(Negative) 


 





 


Urine RBC  3 /hpf (0 - 3)   


 


Urine WBC  2 /hpf (0 - 3)   


 


Urine Squamous Epithelial


Cells 


 Few /hpf (<5) 


 


 





 


Urine Bacteria


 


 None seen /hpf


(None Seen) 


 





 


Urine Mucus


 


 Few (None


Seen) 


 





 


Urine Glucose


 


 Normal mg/dL


(Normal) 


 





 


Urine Opiates Screen  Pos (NEGATIVE)   


 


Urine Fentanyl Screen  Neg (NEGATIVE)   


 


Urine Barbiturates Screen  Neg (NEGATIVE)   


 


Urine Phencyclidine Screen  Neg (NEGATIVE)   


 


Urine Amphetamines Screen  Neg (NEGATIVE)   


 


Urine Benzodiazepines Screen  Neg (NEGATIVE)   


 


Urine Cocaine Screen  Neg (NEGATIVE)   


 


Urine Cannabinoids Screen  Neg (NEGATIVE)   


 


White Blood Count


 


 


 8.9 10^3/uL


(4.4-10.8) 





 


Red Blood Count


 


 


 4.52 10^6/uL


(4.5-5.90) 





 


Hemoglobin


 


 


 11.2 g/dL


(13.5-17.5) 





 


Hematocrit


 


 


 34.7 %


(41.0-53.0) 





 


Mean Corpuscular Volume


 


 


 76.8 fL


(80.0-100.0) 





 


Mean Corpuscular Hemoglobin


 


 


 24.8 pg


(28.0-32.0) 





 


Mean Corpuscular Hemoglobin


Concent 


 


 32.2 g/dL


(32.0-36.0) 





 


Red Cell Distribution Width


 


 


 17.3 %


(11.8-14.3) 





 


Platelet Count


 


 


 373 10^3/uL


(140-450) 





 


Mean Platelet Volume


 


 


 6.3 fL


(6.9-10.8) 





 


Neutrophils (%) (Auto)


 


 


 69.4 %


(37.0-80.0) 





 


Lymphocytes (%) (Auto)


 


 


 22.2 %


(10.0-50.0) 





 


Monocytes (%) (Auto)


 


 


 7.4 %


(0.0-12.0) 





 


Eosinophils (%) (Auto)


 


 


 0.4 %


(0.0-7.0) 





 


Basophils (%) (Auto)


 


 


 0.6 %


(0.0-2.0) 





 


Neutrophils # (Auto)


 


 


 6.2 10 ^3/uL


(1.6-8.6) 





 


Lymphocytes # (Auto)


 


 


 2.0 10 ^3/uL


(0.4-5.4) 





 


Monocytes # (Auto)


 


 


 0.7 10 ^3/uL


(0-1.3) 





 


Eosinophils # (Auto)


 


 


 0 10 ^3/uL


(0-0.8) 





 


Basophils # (Auto)


 


 


 0 10 ^3/uL


(0-0.2) 





 


Nucleated Red Blood Cells   0.0 %  


 


Sodium Level


 


 


 144 mmol/L


(136-145) 





 


Potassium Level


 


 


 3.5 mmol/L


(3.5-5.1) 





 


Chloride Level


 


 


 115 mmol/L


() 





 


Carbon Dioxide Level


 


 


 23 mmol/L


(20-31) 





 


Anion Gap   6 (5-15)  


 


Blood Urea Nitrogen


 


 


 < 5 mg/dL


(9-23) 





 


Creatinine


 


 


 0.83 mg/dL


(0.700-1.30) 





 


Glomerular Filtration Rate


Calc 


 


 105 mL/min


(>90) 





 


BUN/Creatinine Ratio


 


 


 6.0


(10.0-20.0) 





 


Serum Glucose


 


 


 97 mg/dL


() 





 


Calcium Level


 


 


 9.6 mg/dL


(8.7-10.4) 





 


Total Bilirubin


 


 


 0.7 mg/dL


(0.2-1.0) 





 


Aspartate Amino Transferase


(AST) 


 


 19 U/L (13-40) 


 





 


Alanine Aminotransferase (ALT)   < 9 U/L (7-40)  


 


Alkaline Phosphatase


 


 


 95 U/L


() 





 


Total Protein


 


 


 6.4 g/dL


(5.7-8.2) 





 


Albumin


 


 


 3.9 g/dL


(3.2-4.8) 





 


Ammonia


 


 


 


 15 umol/L


(11-32)


 


Test


 1/19/25


10:05 1/19/25


09:05 


 





 


Troponin I High Sensitivity


 < 3 ng/L


(</=54) 


 


 





 


Prothrombin Time


 


 10.9 sec


(9.3-11.8) 


 





 


Prothrombin Time INR


 


 1.03


(0.9-1.15) 


 





 


Activated Partial


Thromboplast Time 


 32.1 SEC


(24.5-34.5) 


 





 


Magnesium Level


 


 2.0 mg/dL


(1.6-2.6) 


 





 


B-Type Natriuretic Peptide


 


 21.45 pg/mL


(0-100) 


 








                             Other Laboratory Tests


1/20/25 04:04











Brief Hx & Hospital Course:


History of Present Illness


This 54 year old male presents in the ED with a chief complaint of ALOC.  The 

patient is alert and oriented to name and date of birth but somehow confused, 

very sleepy, and unable to keep eyes open.  According to nursing staff, the 

patient was last seen alert and oriented this morning around 1:00 a.m. the 

patient change of mentation started early this morning, sister states that the 

patient started taking some type of medications.





Course of hospitalization: 


Patient's medications were reviewed that he was prescribed via pharmacy 

database, with noted multiple narcotics as well as psychiatric medications 

provided by pain management and Psychiatry.  Probable underlying etiology for 

patient's ALOC.  Discussed this with the patient who continues to be only alert 

and oriented to person, place, unable to state what year it is.  Patient denies 

any symptoms at this time.  Patient, states that we do not have a right to keep

him in the hospital.  Patient also states,  that he was aware that he was 

unresponsive and that is the reason why his mother called the ambulance to have 

him coming to the hospital..  Patient request to leave against medical advice. 

Patient instructed to continue current treatment and allow prescribed 

medications to run their course, given polypharmacy with probable unintentional 

overdose with prescribed medications.  This time the patient wishes to leave 

[facility].  All questions answered.





Physical examination


General: Alert and Oriented x3. No acute distress. Well-nourished.


Eyes: EOMI. Anicteric.


HENT: Moist mucous membranes.


Lungs: Clear to auscultation bilaterally. No accessory muscle use.


Cardiovascular: Regular rate and rhythm. No murmur. No JVD.


Abdomen: Soft, non-tender and non-distended. No palpable masses.


Extremities: No edema. Non-tender.


Skin: No rashes or lesions. Warm.


Neurologic: No focal neurological deficits. CN II-XII grossly intact, but not 

individually tested.


Psychiatric: Cooperative. Appropriate mood and affect.





Total time spent with patient discussing and formulating plan of care: 35 

minutes.





This medical document was created using an electronic medical record system with

Dragon computerized dictation system.  Although this document has been carefully

reviewed, there may still be some phonetic and typographical errors.  These 

areas are purely typographical due to imperfections of the software programs, 

and do not reflect any compromise in the patient's medical care.





Consults/Reason for consult


Neurology: Rule out CVA





Condition at Discharge:


Poor





Final Diagnosis/Problems List





Toxic metabolic encephalopathy secondary to prescribed narcotics and


antipsychotic medications


Secondary Diagnosis:  


Anxiety disorder 


-depression


-history of CVA 


-primary hypertension





Discharge Disposition:


AMA


36


Discharge Statement:


"Patient was advised to return to the ER or call 911 if any headaches, 

dizziness, shortness of breath, chest pain, abdominal pain, bleeding, fevers, or

worsening of medical condition.





Patient was counseled about treatment plan, medications, possible side effects, 

patientverbalized understanding. All questions were answered to the best of my 

ability.





This discharge took greater then 30 minutes in planning, reviewing 

documentation, counseling the patient, and discussing with other team members."





ASSESSMENT








Date of Service:  Jan 20, 2025


Billing Provider:  ARI CONLEY NP


Common Visit Codes:  05469-VWQ/OBS DISCH DAY >30min











ARI CONLEY NP            Jan 20, 2025 12:36

## 2025-01-20 NOTE — ECG
[facility]

                                       

Test Date:    2025               Test Time:    09:42:37

Pat Name:     DUANE BERNSTEIN             Department:   ER

Patient ID:   [id number]           Room:         [address] [place] [address]

Gender:       M                        Technician:   DEEPA

:          1970               Requested By: STEVEN VEGA

Order Number: [id number]        Reading MD:    

                                 Measurements

Intervals                              Axis          

Rate:         80                       P:            0

PA:           0                        QRS:          -70

QRSD:         116                      T:            53

QT:           363                                    

QTc:          419                                    

                           Interpretive Statements

Atrial fibrillation

Nonspecific IVCD with LAD

Low voltage, precordial leads

Artifact in lead(s) I,II,III,aVR,aVL,aVF



Please click the below link to view image of tracing.